# Patient Record
Sex: FEMALE | URBAN - METROPOLITAN AREA
[De-identification: names, ages, dates, MRNs, and addresses within clinical notes are randomized per-mention and may not be internally consistent; named-entity substitution may affect disease eponyms.]

---

## 2024-10-17 ENCOUNTER — TELEPHONE (OUTPATIENT)
Dept: PULMONOLOGY | Age: 34
End: 2024-10-17

## 2024-10-17 NOTE — TELEPHONE ENCOUNTER
Dr Ruiz received a call from [facility].  Per Dr Ruiz, patient needs office appointment to discuss EBUS. I have spoken with office of Berk[facility], she will fax office notes and CPFT to this office. I have faxed request to [facility] to obtain 8/30/2024 and 6/5/2024 images to PACS.     Noted from Dr Olsen scanned into EPIC. I have left patient a message to contact office.